# Patient Record
Sex: MALE | Race: WHITE | Employment: OTHER | URBAN - METROPOLITAN AREA
[De-identification: names, ages, dates, MRNs, and addresses within clinical notes are randomized per-mention and may not be internally consistent; named-entity substitution may affect disease eponyms.]

---

## 2017-08-14 ENCOUNTER — OP HISTORICAL/CONVERTED ENCOUNTER (OUTPATIENT)
Dept: OTHER | Age: 72
End: 2017-08-14

## 2019-12-09 ENCOUNTER — OP HISTORICAL/CONVERTED ENCOUNTER (OUTPATIENT)
Dept: OTHER | Age: 74
End: 2019-12-09

## 2021-06-01 ENCOUNTER — APPOINTMENT (OUTPATIENT)
Dept: GENERAL RADIOLOGY | Age: 76
End: 2021-06-01
Attending: EMERGENCY MEDICINE
Payer: MEDICARE

## 2021-06-01 ENCOUNTER — HOSPITAL ENCOUNTER (EMERGENCY)
Age: 76
Discharge: HOME | End: 2021-06-01
Attending: EMERGENCY MEDICINE
Payer: MEDICARE

## 2021-06-01 VITALS
OXYGEN SATURATION: 98 % | HEART RATE: 73 BPM | BODY MASS INDEX: 29.96 KG/M2 | DIASTOLIC BLOOD PRESSURE: 90 MMHG | HEIGHT: 71 IN | WEIGHT: 214 LBS | SYSTOLIC BLOOD PRESSURE: 162 MMHG | TEMPERATURE: 98 F | RESPIRATION RATE: 16 BRPM

## 2021-06-01 DIAGNOSIS — M54.16 LUMBAR RADICULOPATHY: ICD-10-CM

## 2021-06-01 DIAGNOSIS — R20.0 RIGHT LEG NUMBNESS: ICD-10-CM

## 2021-06-01 DIAGNOSIS — M54.40 ACUTE RIGHT-SIDED LOW BACK PAIN WITH SCIATICA, SCIATICA LATERALITY UNSPECIFIED: Primary | ICD-10-CM

## 2021-06-01 PROCEDURE — 74011636637 HC RX REV CODE- 636/637: Performed by: EMERGENCY MEDICINE

## 2021-06-01 PROCEDURE — 75810000275 HC EMERGENCY DEPT VISIT NO LEVEL OF CARE

## 2021-06-01 PROCEDURE — 72100 X-RAY EXAM L-S SPINE 2/3 VWS: CPT

## 2021-06-01 PROCEDURE — 99283 EMERGENCY DEPT VISIT LOW MDM: CPT

## 2021-06-01 PROCEDURE — 73560 X-RAY EXAM OF KNEE 1 OR 2: CPT

## 2021-06-01 RX ORDER — GABAPENTIN 300 MG/1
CAPSULE ORAL
COMMUNITY
Start: 2021-05-27

## 2021-06-01 RX ORDER — HYDROCHLOROTHIAZIDE 12.5 MG/1
TABLET ORAL
COMMUNITY
Start: 2021-05-14

## 2021-06-01 RX ORDER — GABAPENTIN 600 MG/1
TABLET ORAL
COMMUNITY
Start: 2021-04-26

## 2021-06-01 RX ORDER — PREDNISONE 20 MG/1
60 TABLET ORAL
Status: COMPLETED | OUTPATIENT
Start: 2021-06-01 | End: 2021-06-01

## 2021-06-01 RX ORDER — ASPIRIN 81 MG/1
81 TABLET ORAL DAILY
COMMUNITY

## 2021-06-01 RX ORDER — SUCRALFATE 1 G/1
TABLET ORAL
COMMUNITY
Start: 2021-05-12

## 2021-06-01 RX ORDER — METHYLPREDNISOLONE 4 MG/1
TABLET ORAL
Qty: 1 DOSE PACK | Refills: 0 | Status: SHIPPED | OUTPATIENT
Start: 2021-06-01

## 2021-06-01 RX ADMIN — PREDNISONE 60 MG: 20 TABLET ORAL at 12:10

## 2021-06-01 NOTE — ED TRIAGE NOTES
Started on 5-, buttock hip sore sharp pain, went down to knee (pt states poss Arthritis), down to foot, numbness to anterior lower leg, states decrease feeling in lower anterior leg right. PMS good, pt limped while walking to room, nothing makes better, heat helped just a little bit, walking and bending leg makes worse.   PT states Dr. Devin Fermin pinched nerve and or sciatica  +2 pulses

## 2021-06-01 NOTE — ED PROVIDER NOTES
EMERGENCY DEPARTMENT HISTORY AND PHYSICAL EXAM      Date: 6/1/2021  Patient Name: Cabrera Feng    History of Presenting Illness     Chief Complaint   Patient presents with    Leg Pain    Numbness       History Provided By: Patient    HPI: Cabrera Feng, 67 y.o. male with a past medical history significant hypertension presents to the ED with cc of right leg pain and numbness on outer aspect of lower leg. Pt states he has some right sided low back pain into upper buttock that extends down back of right leg, and then is experiencing some right outer leg numbness without weakness. There are no other complaints, changes, or physical findings at this time. PCP: Pauline Gandhi MD    No current facility-administered medications on file prior to encounter. Current Outpatient Medications on File Prior to Encounter   Medication Sig Dispense Refill    hydroCHLOROthiazide (HYDRODIURIL) 12.5 mg tablet take 1 tablet by mouth ONCE A DAY      gabapentin (NEURONTIN) 300 mg capsule take 1 capsule by mouth IN THE MORNING and take 1 capsule by mouth MIDDAY      gabapentin (NEURONTIN) 600 mg tablet take 1 tablet by mouth once daily AT NIGHT      sucralfate (CARAFATE) 1 gram tablet take 1 tablet by mouth three times a day      aspirin delayed-release 81 mg tablet Take 81 mg by mouth daily.  simvastatin (ZOCOR) 20 mg tablet Take  by mouth nightly.  vitamin a-vitamin c-vit e-min (OCUVITE) tablet Take 1 Tab by mouth daily.  naproxen (NAPROSYN) 500 mg tablet Take 500 mg by mouth two (2) times daily (with meals).  (Patient not taking: Reported on 6/1/2021)         Past History     Past Medical History:  Past Medical History:   Diagnosis Date    Arthritis     Neck and left elbow and fingers    High cholesterol     Hypertension     Other ill-defined conditions(799.89)     high cholesterol    Other ill-defined conditions(799.89)     macular degeneration    Other ill-defined conditions(799.89) diverticulitis 2012       Past Surgical History:  Past Surgical History:   Procedure Laterality Date    HX OTHER SURGICAL      ulcer oversew 30 years ago    [place] [facility] FLX W/RMVL OF TUMOR POLYP LESION SNARE TQ  9/12/2013            Family History:  History reviewed. No pertinent family history. Social History:  Social History     Tobacco Use    Smoking status: Never Smoker    Smokeless tobacco: Current User   Substance Use Topics    Alcohol use: No    Drug use: Never       Allergies:  No Known Allergies      Review of Systems     Review of Systems   Constitutional: Negative for chills and fever. HENT: Negative. Negative for congestion, rhinorrhea, sneezing and sore throat. Eyes: Negative. Negative for redness and visual disturbance. Respiratory: Negative. Negative for cough, shortness of breath and wheezing. Cardiovascular: Negative. Negative for chest pain and leg swelling. Gastrointestinal: Negative. Negative for abdominal pain, diarrhea, nausea and vomiting. Genitourinary: Negative for difficulty urinating and frequency. Musculoskeletal: Positive for arthralgias, back pain and myalgias. Negative for neck stiffness. Skin: Negative. Negative for color change and rash. Neurological: Negative. Negative for dizziness, syncope, weakness, numbness and headaches. Hematological: Negative for adenopathy. Psychiatric/Behavioral: Negative. All other systems reviewed and are negative. Physical Exam     Physical Exam  Vitals and nursing note reviewed. Constitutional:       General: He is not in acute distress. HENT:      Head: Atraumatic. Eyes:      Conjunctiva/sclera: Conjunctivae normal.      Pupils: Pupils are equal, round, and reactive to light. Cardiovascular:      Rate and Rhythm: Normal rate and regular rhythm. Heart sounds: Normal heart sounds. No murmur heard. Pulmonary:      Effort: Pulmonary effort is normal. No respiratory distress.       Breath sounds: Normal breath sounds. No wheezing or rales. Chest:      Chest wall: No tenderness. Abdominal:      General: Bowel sounds are normal. There is no distension. Palpations: Abdomen is soft. There is no mass. Tenderness: There is no abdominal tenderness. There is no guarding or rebound. Musculoskeletal:         General: Tenderness present. Normal range of motion. Cervical back: Normal range of motion. Comments: ttp over right buttock, SI joint    No symptoms reproduced with tapping peroneal nerve    Strength bilat LE    No foot drop               Skin:     General: Skin is warm. Findings: No erythema or rash. Neurological:      Mental Status: He is alert and oriented to person, place, and time. Cranial Nerves: No cranial nerve deficit. Lab and Diagnostic Study Results         XR SPINE LUMB 2 OR 3 V (Final result)  Result time 06/01/21 13:41:07  Final result by Karthik Preciado MD (06/01/21 13:41:07)                Impression:    Lumbar spondylosis. No offset lumbar spine. Mild age-indeterminate loss normal vertebral body height lower thoracic/upper   lumbar vertebral bodies. Narrative:    Lumbar spine, 3 views     Normal alignment lumbar vertebral column. Endplate sclerosis with disc space   narrowing lower thoracic/upper lumbar levels. Mild loss of same thoracic/lumbar   vertebral body heights. Facet hypertrophy more so through lower lumbar levels. SI joints intact. Abdominal aorta atherosclerosis. Prior abdominal surgeries.                     XR KNEE RT MAX 2 VWS (Final result)  Result time 06/01/21 13:44:08  Final result by Moni Granados MD (06/01/21 13:44:08)                Impression:    1. No acute bony findings. 2. Posterior calcific atherosclerosis. Narrative:    Pain. No comparison. FINDINGS: 3 views of the right knee. No acute fracture or dislocation. Knee   joint maintained.  No suprapatellar effusion or radiopaque foreign body. Mild   calcific atherosclerosis posteriorly, above and below the knee. Medical Decision Making   - I am the first provider for this patient. - I reviewed the vital signs, available nursing notes, past medical history, past surgical history, family history and social history. - Initial assessment performed. The patients presenting problems have been discussed, and they are in agreement with the care plan formulated and outlined with them. I have encouraged them to ask questions as they arise throughout their visit. Vital Signs-Reviewed the patient's vital signs. Patient Vitals for the past 12 hrs:   Temp Pulse Resp BP SpO2   06/01/21 1153 98 °F (36.7 °C) 73 16 (!) 162/90 98 %       Records Reviewed: Nursing Notes       Provider Notes (Medical Decision Making):     MDM       The patient presents with leg pain and numbness with a differential diagnosis of sciatica, DDD, peroneal nerve injury without signs of spinal injury or cauda equina. No saddle anesthesia, no bowel or bladder dysfunction, no leg weakness. ED Course:            Procedures   Medical Decision Makingedical Decision Making        Disposition   Disposition: DC- Adult Discharges: All of the diagnostic tests were reviewed and questions answered. Diagnosis, care plan and treatment options were discussed. The patient understands the instructions and will follow up as directed. The patients results have been reviewed with them. They have been counseled regarding their diagnosis. The patient verbally convey understanding and agreement of the signs, symptoms, diagnosis, treatment and prognosis and additionally agrees to follow up as recommended with their PCP in 24 - 48 hours. They also agree with the care-plan and convey that all of their questions have been answered.   I have also put together some discharge instructions for them that include: 1) educational information regarding their diagnosis, 2) how to care for their diagnosis at home, as well a 3) list of reasons why they would want to return to the ED prior to their follow-up appointment, should their condition change. DISCHARGE PLAN:  1. Current Discharge Medication List      CONTINUE these medications which have NOT CHANGED    Details   hydroCHLOROthiazide (HYDRODIURIL) 12.5 mg tablet take 1 tablet by mouth ONCE A DAY      gabapentin (NEURONTIN) 300 mg capsule take 1 capsule by mouth IN THE MORNING and take 1 capsule by mouth MIDDAY      gabapentin (NEURONTIN) 600 mg tablet take 1 tablet by mouth once daily AT NIGHT      sucralfate (CARAFATE) 1 gram tablet take 1 tablet by mouth three times a day      aspirin delayed-release 81 mg tablet Take 81 mg by mouth daily. simvastatin (ZOCOR) 20 mg tablet Take  by mouth nightly. vitamin a-vitamin c-vit e-min (OCUVITE) tablet Take 1 Tab by mouth daily. naproxen (NAPROSYN) 500 mg tablet Take 500 mg by mouth two (2) times daily (with meals). 2.   Follow-up Information     Follow up With Specialties Details Why Contact Info    [facility]  Schedule an appointment as soon as possible for a visit   [id number] [facility]  As needed, If symptoms worsen [address] [place] [id number] [phone]        3. Return to ED if worse   4.    Discharge Medication List as of 6/1/2021  2:15 PM      START taking these medications    Details   methylPREDNISolone (Medrol, Cal,) 4 mg tablet Take as directed, Normal, Disp-1 Dose Pack, R-0         CONTINUE these medications which have NOT CHANGED    Details   hydroCHLOROthiazide (HYDRODIURIL) 12.5 mg tablet take 1 tablet by mouth ONCE A DAY, Historical Med      gabapentin (NEURONTIN) 300 mg capsule take 1 capsule by mouth IN THE MORNING and take 1 capsule by mouth MIDDAY, Historical Med      gabapentin (NEURONTIN) 600 mg tablet take 1 tablet by mouth once daily AT NIGHT, Historical Med      sucralfate (CARAFATE) 1 gram tablet take 1 tablet by mouth three times a day, Historical Med      aspirin delayed-release 81 mg tablet Take 81 mg by mouth daily. , Historical Med      simvastatin (ZOCOR) 20 mg tablet Take  by mouth nightly. Historical Med      naproxen (NAPROSYN) 500 mg tablet Take 500 mg by mouth two (2) times daily (with meals). Historical Med, 500 mg      vitamin a-vitamin c-vit e-min (OCUVITE) tablet Take 1 Tab by mouth daily. Historical Med, 1 Tab               Diagnosis     Clinical Impression:   1. Acute right-sided low back pain with sciatica, sciatica laterality unspecified    2. Lumbar radiculopathy    3. Right leg numbness        Attestations:    Barb Raymundo MD    Please note that this dictation was completed with Part[employer], the [employer] voice recognition software. Quite often unanticipated grammatical, syntax, homophones, and other interpretive errors are inadvertently transcribed by the computer software. Please disregard these errors. Please excuse any errors that have escaped final proofreading. Thank you.

## 2021-08-24 ENCOUNTER — TRANSCRIBE ORDER (OUTPATIENT)
Dept: SCHEDULING | Age: 76
End: 2021-08-24

## 2021-08-24 DIAGNOSIS — M54.16 LUMBAR RADICULOPATHY: Primary | ICD-10-CM

## 2023-05-16 RX ORDER — SIMVASTATIN 20 MG
TABLET ORAL
COMMUNITY

## 2023-05-16 RX ORDER — GABAPENTIN 300 MG/1
CAPSULE ORAL
COMMUNITY
Start: 2021-05-27

## 2023-05-16 RX ORDER — GABAPENTIN 600 MG/1
TABLET ORAL
COMMUNITY
Start: 2021-04-26

## 2023-05-16 RX ORDER — METHYLPREDNISOLONE 4 MG/1
TABLET ORAL
COMMUNITY
Start: 2021-06-01

## 2023-05-16 RX ORDER — NAPROXEN 500 MG/1
500 TABLET ORAL 2 TIMES DAILY WITH MEALS
COMMUNITY

## 2023-05-16 RX ORDER — ASPIRIN 81 MG/1
81 TABLET ORAL DAILY
COMMUNITY

## 2023-05-16 RX ORDER — HYDROCHLOROTHIAZIDE 12.5 MG/1
1 TABLET ORAL DAILY
COMMUNITY
Start: 2021-05-14

## 2023-05-16 RX ORDER — SUCRALFATE 1 G/1
1 TABLET ORAL 3 TIMES DAILY
COMMUNITY
Start: 2021-05-12